# Patient Record
Sex: MALE | Race: WHITE | NOT HISPANIC OR LATINO | Employment: FULL TIME | URBAN - METROPOLITAN AREA
[De-identification: names, ages, dates, MRNs, and addresses within clinical notes are randomized per-mention and may not be internally consistent; named-entity substitution may affect disease eponyms.]

---

## 2019-08-15 ENCOUNTER — HOSPITAL ENCOUNTER (EMERGENCY)
Facility: HOSPITAL | Age: 19
Discharge: HOME | End: 2019-08-15
Attending: EMERGENCY MEDICINE
Payer: COMMERCIAL

## 2019-08-15 VITALS
SYSTOLIC BLOOD PRESSURE: 115 MMHG | TEMPERATURE: 98.8 F | WEIGHT: 81.9 LBS | OXYGEN SATURATION: 100 % | HEIGHT: 72 IN | RESPIRATION RATE: 16 BRPM | HEART RATE: 79 BPM | BODY MASS INDEX: 11.09 KG/M2 | DIASTOLIC BLOOD PRESSURE: 61 MMHG

## 2019-08-15 DIAGNOSIS — T63.441A BEE STING: Primary | ICD-10-CM

## 2019-08-15 DIAGNOSIS — R11.2 NAUSEA & VOMITING: ICD-10-CM

## 2019-08-15 PROCEDURE — 99283 EMERGENCY DEPT VISIT LOW MDM: CPT | Performed by: PHYSICIAN ASSISTANT

## 2019-08-15 PROCEDURE — 99282 EMERGENCY DEPT VISIT SF MDM: CPT

## 2019-08-15 RX ORDER — ONDANSETRON 4 MG/1
4 TABLET, ORALLY DISINTEGRATING ORAL ONCE
Status: COMPLETED | OUTPATIENT
Start: 2019-08-15 | End: 2019-08-15

## 2019-08-15 RX ADMIN — ONDANSETRON 4 MG: 4 TABLET, ORALLY DISINTEGRATING ORAL at 10:06

## 2019-08-15 NOTE — ED PROVIDER NOTES
History  Chief Complaint   Patient presents with   Murvin Sheila Sting     pt states stung by bee this past Sunday     20year-old male presents to the emergency department with complaints of nausea with vomiting  States that he started earlier this morning and had 1 episode of vomiting on his way to work  States that he had not eaten breakfast at that time and vomited stomach contents and bile  Denies abdominal pain presently but states that he still has some mild nausea  No diarrhea  No fevers  Reports that he was bitten by a bee 4 days ago on the back of his left lower extremity  Has been having improvement of the redness surrounding the area  Did not no longer hurts  Notes that he did take a dose of Benadryl 3 days ago and again 2 days ago but nothing yesterday  Concerned that symptoms could be related  History provided by:  Patient   used: No        None       History reviewed  No pertinent past medical history  History reviewed  No pertinent surgical history  History reviewed  No pertinent family history  I have reviewed and agree with the history as documented  Social History     Tobacco Use    Smoking status: Current Every Day Smoker    Tobacco comment: vaping   Substance Use Topics    Alcohol use: Yes    Drug use: Never        Review of Systems   Constitutional: Negative for chills and fever  Respiratory: Negative for cough  Cardiovascular: Negative for chest pain  Gastrointestinal: Positive for nausea and vomiting  Negative for abdominal pain and diarrhea  Musculoskeletal: Negative for arthralgias and back pain  Skin: Positive for color change  Negative for rash and wound  All other systems reviewed and are negative  Physical Exam  Physical Exam   Constitutional: He is oriented to person, place, and time  He appears well-developed and well-nourished  No distress  HENT:   Head: Normocephalic and atraumatic     Right Ear: External ear normal    Left Ear: External ear normal    Mouth/Throat: Oropharynx is clear and moist  No oropharyngeal exudate  Eyes: Conjunctivae are normal    Neck: No JVD present  No tracheal deviation present  Cardiovascular: Normal rate, regular rhythm and normal heart sounds  Exam reveals no gallop and no friction rub  No murmur heard  Pulmonary/Chest: Effort normal and breath sounds normal  No respiratory distress  He has no wheezes  He has no rales  He exhibits no tenderness  Abdominal: Soft  Bowel sounds are normal  He exhibits no distension  There is no tenderness  There is no guarding  Musculoskeletal: Normal range of motion  He exhibits no edema, tenderness or deformity  Legs:  Lymphadenopathy:     He has no cervical adenopathy  Neurological: He is alert and oriented to person, place, and time  Skin: Skin is warm and dry  No rash noted  He is not diaphoretic  No erythema  Psychiatric: He has a normal mood and affect  His behavior is normal    Nursing note and vitals reviewed  Vital Signs  ED Triage Vitals [08/15/19 0940]   Temperature Pulse Respirations Blood Pressure SpO2   98 8 °F (37 1 °C) 79 16 115/61 100 %      Temp Source Heart Rate Source Patient Position - Orthostatic VS BP Location FiO2 (%)   Oral -- -- -- --      Pain Score       No Pain           Vitals:    08/15/19 0940   BP: 115/61   Pulse: 79         Visual Acuity      ED Medications  Medications   ondansetron (ZOFRAN-ODT) dispersible tablet 4 mg (4 mg Oral Given 8/15/19 1006)       Diagnostic Studies  Results Reviewed     None                 No orders to display              Procedures  Procedures       ED Course                               MDM  Number of Diagnoses or Management Options  Bee sting:   Nausea & vomiting:   Diagnosis management comments: Differential diagnosis includes but not limited to:   Insect bite, gastritis, gastroenteritis        Disposition  Final diagnoses:   Bee sting   Nausea & vomiting     Time reflects when diagnosis was documented in both MDM as applicable and the Disposition within this note     Time User Action Codes Description Comment    8/15/2019 10:08 AM Nicolas Bailons Add [A66 541T] Bee sting     8/15/2019 10:09 AM Danisha Monge Add [R11 2] Nausea & vomiting       ED Disposition     ED Disposition Condition Date/Time Comment    Discharge Stable Thu Aug 15, 2019 10:08 AM Bela Silva discharge to home/self care  Follow-up Information    None         Patient's Medications    No medications on file     No discharge procedures on file      ED Provider  Electronically Signed by           Jonathan Amanda PA-C  08/15/19 8838

## 2019-08-15 NOTE — ED NOTES
Pt seen, assessed and d/c by provider  Pt appeared to be in no acute distress upon discharge  Pt able to ambulate well without assistance upon exiting        [facility] INES Medeiros  08/15/19 0594

## 2019-08-22 NOTE — ED ATTENDING ATTESTATION
Nirmal Pickering MD, saw and evaluated the patient  I have discussed the patient with the resident/non-physician practitioner and agree with the resident's/non-physician practitioner's findings, Plan of Care, and MDM as documented in the resident's/non-physician practitioner's note, except where noted  All available labs and Radiology studies were reviewed  I was present for key portions of any procedure(s) performed by the resident/non-physician practitioner and I was immediately available to provide assistance  At this point I agree with the current assessment done in the Emergency Department    I have conducted an independent evaluation of this patient a history and physical is as follows:      Critical Care Time  Procedures